# Patient Record
Sex: FEMALE | Race: BLACK OR AFRICAN AMERICAN | Employment: FULL TIME | URBAN - METROPOLITAN AREA
[De-identification: names, ages, dates, MRNs, and addresses within clinical notes are randomized per-mention and may not be internally consistent; named-entity substitution may affect disease eponyms.]

---

## 2020-09-09 ENCOUNTER — VIRTUAL VISIT (OUTPATIENT)
Dept: FAMILY MEDICINE CLINIC | Age: 50
End: 2020-09-09
Payer: COMMERCIAL

## 2020-09-09 DIAGNOSIS — R12 HEARTBURN: ICD-10-CM

## 2020-09-09 DIAGNOSIS — E11.9 TYPE 2 DIABETES MELLITUS WITHOUT COMPLICATION, WITHOUT LONG-TERM CURRENT USE OF INSULIN (HCC): ICD-10-CM

## 2020-09-09 DIAGNOSIS — R35.1 NOCTURIA MORE THAN TWICE PER NIGHT: ICD-10-CM

## 2020-09-09 DIAGNOSIS — R06.09 DOE (DYSPNEA ON EXERTION): ICD-10-CM

## 2020-09-09 DIAGNOSIS — Z83.3 FAMILY HISTORY OF DIABETES MELLITUS (DM): ICD-10-CM

## 2020-09-09 DIAGNOSIS — R53.82 CHRONIC FATIGUE: ICD-10-CM

## 2020-09-09 DIAGNOSIS — Z83.49 FAMILY HISTORY OF OBESITY: ICD-10-CM

## 2020-09-09 DIAGNOSIS — J33.0 NASAL POLYP, BENIGN: ICD-10-CM

## 2020-09-09 DIAGNOSIS — I10 ESSENTIAL HYPERTENSION: ICD-10-CM

## 2020-09-09 DIAGNOSIS — Z82.49 FAMILY HISTORY OF HEART DISEASE: ICD-10-CM

## 2020-09-09 DIAGNOSIS — F43.23 ADJUSTMENT DISORDER WITH MIXED ANXIETY AND DEPRESSED MOOD: ICD-10-CM

## 2020-09-09 DIAGNOSIS — E55.9 VITAMIN D DEFICIENCY: ICD-10-CM

## 2020-09-09 DIAGNOSIS — T78.40XA ALLERGIC STATE, INITIAL ENCOUNTER: ICD-10-CM

## 2020-09-09 DIAGNOSIS — E03.4 HYPOTHYROIDISM DUE TO ACQUIRED ATROPHY OF THYROID: Primary | ICD-10-CM

## 2020-09-09 DIAGNOSIS — R63.5 WEIGHT GAIN: ICD-10-CM

## 2020-09-09 DIAGNOSIS — Z72.821 INADEQUATE SLEEP HYGIENE: ICD-10-CM

## 2020-09-09 DIAGNOSIS — Z82.69 FAMILY HISTORY OF GOUT: ICD-10-CM

## 2020-09-09 DIAGNOSIS — K58.0 IRRITABLE BOWEL SYNDROME WITH DIARRHEA: ICD-10-CM

## 2020-09-09 PROBLEM — E03.9 HYPOTHYROIDISM: Status: ACTIVE | Noted: 2018-01-01

## 2020-09-09 PROCEDURE — 99215 OFFICE O/P EST HI 40 MIN: CPT | Performed by: FAMILY MEDICINE

## 2020-09-09 RX ORDER — NORETHINDRONE ACETATE AND ETHINYL ESTRADIOL, ETHINYL ESTRADIOL AND FERROUS FUMARATE 1MG-10(24)
KIT ORAL
COMMUNITY
Start: 2020-09-03

## 2020-09-09 RX ORDER — METFORMIN HYDROCHLORIDE 1000 MG/1
TABLET ORAL
COMMUNITY
Start: 2020-04-23

## 2020-09-09 RX ORDER — BECLOMETHASONE DIPROPIONATE HFA 80 UG/1
AEROSOL, METERED RESPIRATORY (INHALATION)
COMMUNITY
Start: 2020-08-19

## 2020-09-09 RX ORDER — CHOLECALCIFEROL (VITAMIN D3) 125 MCG
CAPSULE ORAL
COMMUNITY

## 2020-09-09 RX ORDER — MINERAL OIL
180 ENEMA (ML) RECTAL DAILY
COMMUNITY

## 2020-09-09 RX ORDER — MONTELUKAST SODIUM 10 MG/1
10 TABLET ORAL DAILY
COMMUNITY

## 2020-09-09 RX ORDER — LEVOTHYROXINE SODIUM 50 UG/1
TABLET ORAL
COMMUNITY
Start: 2020-06-21

## 2020-09-09 RX ORDER — DEXLANSOPRAZOLE 60 MG/1
60 CAPSULE, DELAYED RELEASE ORAL AS NEEDED
COMMUNITY

## 2020-09-09 RX ORDER — DOXAZOSIN 1 MG/1
TABLET ORAL
COMMUNITY
Start: 2019-07-18

## 2020-09-09 RX ORDER — TELMISARTAN 80 MG/1
TABLET ORAL
COMMUNITY
Start: 2020-07-06

## 2020-09-09 NOTE — PROGRESS NOTES
[facility] Medically Supervised   [facility]   [facility]    VIRTUAL INITIAL PHYSICIAN VISIT      Pursuant to the emergency declaration under the [address] and [place] [address] waiver authority and the [employer] Act, this Virtual Visit was conducted, with patient's consent, to reduce the patient's risk of exposure to COVID-19 and provide continuity of care for an established patient. Services were provided through a video synchronous discussion virtually to substitute for in-person appointment. Consent:  She and/or her healthcare decision maker is aware that this patient-initiated Telehealth encounter is a billable service, with coverage as determined by her insurance carrier. She is aware that she may receive a bill and has provided verbal consent to proceed: Yes    HISTORY OF PRESENT ILLNESS  Agree with nurse and registered dietician notes. Genaro Lujan is a 48 y.o. female with Obesity Class I, There is no height or weight on file to calculate BMI. and associated health concerns presents for evaluation and treatment of weight management. Have you ever participated in any other weight loss programs, self directed or commercial? If so, maximum weight loss? Weight Watchers    Do you have any current major lifestyle changes? She is working remotely due to the Covid-19 pandemic. This has caused her to lose motivation and be more sedentary. 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 Weight History    Current weight 225.8 lbs. Lowest weight 215 lbs. Maximum weight 233 lbs. Goal weight 199 lbs. Have you ever taken weight loss medications or herbal remedies? No.               Have you or anyone in your family ever had weight loss surgery? No.     Eating Habits  How many times a week do you eat fast food or at restaurants?  1  Are you skipping meals and if so, why? No.  Which meals do you eat? Breakfast: oatmeal and a banana, boiled or scrambled egg with turkey barton Lunch: dinner leftover, baked chicken with rice and broccoli; Dinner: Chick-tommy-a nuggets with fries and Coke Zero; Snack: chocolate  Do you have upcoming travel in the next 6 weeks? No.  Do you have a history of binge eating disorder, anorexia, and bulimia? No.              If yes, how long ago and how was it treated? N/A     Drinking Habits  How much caffeine do you drink daily? Do you consume any sugar-sweetened beverages (sodas, teas, juices, etc.)? Coffee with Stevia, tea, [employer] drinks  How much alcohol do you drink daily and weekly? None. How much water do you consume daily? 64-72 oz     Sleep Habits  Do you sleep between 7-9 hours a night? Pt with inadequate sleep hygiene and nocturia 4-5 times nightly gets 4-5 hours of sleep nightly. Have you been diagnosed with Sleep Apnea in the past? Yes. Do you regularly use a CPAP machine? No due to allergic state and L nasal polyp. Exercise  How many days a week do you currently exercise? 1 day a week for 30 minutes  Have you ever been told by a physician not to exercise? No.  Do you know of any reason you shouldn't exercise? No.  Do you own a pedometer or fitness tracker? Yes, a Fitbit. Do you have a gym membership? Yes. What's your favorite physical activity? Walking. She was walking trails daily prior to Covid. Other History  Any history of drug abuse or dependence? No.  Are you pregnant or planning on becoming pregnant within 6 months? No  How many times have you been pregnant? 1 times and 1 children. Any potential unsupportive people in your life? Yes, her sister. Are you ready to lose weight and become healthier? Yes. She is concerned about her health and her increased risk for Covid-19.      Other Medical Care    Pt with hypertension, type 2 DM, hypothyroidism, adjustment disorder with mixed anxiety and depressed mood, IBS, vit d deficiency, heartburn, chronic fatigue, HASSAN, and family hx of gout, obesity, heart disease, and DM presents to the office with a BP of 183/105. She has not taken her medication before checking her BP today. She takes telmisartan 80 mg daily and Cardura 1 mg nightly for BP; tolerating well. Patient denies vision changes, headaches, dizziness, chest pain, SOB, or swelling. She takes metformin 1,000 mg BID for type 2 DM; tolerating well. She takes Synthroid 50 mcg daily for thyroid; tolerating well. She takes Dexilant 60 mg prn for heartburn with relief. Depression Screening    3 most recent PHQ Screens 9/9/2020   Little interest or pleasure in doing things Several days   Feeling down, depressed, irritable, or hopeless Several days   Total Score PHQ 2 2      Pt denies any contraindications to LCD including: history of MI in the last 3 months, Type 1 DM, Type 2 DM, Liver or Kidney disease requiring protein restriction, Recent treatment for Cancer, History of Uric-acid Kidney Stone, Gout, Gall stones, Recent onset of Inflammatory Bowel Disease, or severe Food Allergies. ROS:    Review of Systems negative except as noted above in HPI. ALLERGIES:    Allergies   Allergen Reactions    Sulfa (Sulfonamide Antibiotics) Hives       CURRENT MEDICATIONS:    Outpatient Medications Marked as Taking for the 9/9/20 encounter (Virtual Visit) with Carolyn Cook,    Medication Sig Dispense Refill    metFORMIN (GLUCOPHAGE) 1,000 mg tablet TAKE 1 TABLET BY MOUTH TWICE A DAY      telmisartan (MICARDIS) 80 mg tablet TAKE 1 TABLET BY MOUTH EVERY DAY      levothyroxine (SYNTHROID) 50 mcg tablet TAKE 1 TABLET BY MOUTH EVERY DAY      Qvar RediHaler 80 mcg/actuation HFAb inhaler INHALE 1 2 INHALATIONS TWICE DAILY FOR ASTHMA. USE REGULARLY, AND RINSE MOUTH AFTER USE.  montelukast (Singulair) 10 mg tablet Take 10 mg by mouth daily.  dexlansoprazole (DEXILANT) 60 mg CpDB capsule (delayed release) Take 60 mg by mouth as needed.  doxazosin (CARDURA) 1 mg tablet take 1 tab every evening (1mg)      cholecalciferol, vitamin D3, (Vitamin D3) 50 mcg (2,000 unit) tab Take  by mouth.  ALBUTEROL IN Take  by inhalation.  fexofenadine (ALLEGRA) 180 mg tablet Take 180 mg by mouth daily. Indications: inflammation of the nose due to an allergy         PAST MEDICAL HISTORY:    Past Medical History:   Diagnosis Date    Allergies     year round. Dr. Emelina Hairston.  Asthma     Cyst of right ovary     w varicose veins. Removed.  Diabetes ([address] 2000s    Dr. Maykel Sharp.  Hypertension 2000s    Hypothyroidism 2018    Dr. Maykel Sharp    IBS (irritable bowel syndrome) 2006    Dr. Ellouise Cogan.  w diarrhea predominance    Nasal polyp, benign     left. Dr. Mukesh Davenport Obesity     VELMA (obstructive sleep apnea) 2009    Dr. Joe Calhoun. txd CPAP    Vitamin D deficiency        PAST SURGICAL HISTORY:    Past Surgical History:   Procedure Laterality Date    HX OTHER SURGICAL  1972    OPEN HEART SURGERY due to [address] [place] ? EXTRA BLOOD VESSEL    HX SALPINGO-OOPHORECTOMY Right 2007    due to Ovarian tumor and Varicose Veins.   Dr. Sherryle Kirill:    Family History   Problem Relation Age of Onset    Hypertension Mother     Arthritis-osteo Mother     High Cholesterol Mother     Heart Disease Mother         ANGINA    Dementia Mother     Hypertension Father     Diabetes Father     Gout Father     High Cholesterol Father     Asthma Father     Obesity Father     Diabetes Sister 36        type 2    Childhood heart surgery Sister         OPEN HEART SURGERY    Heart Failure Sister     Hypertension Sister     Obesity Sister     Diabetes Sister     Hypertension Sister     Other Sister         KIDNEY STONE    Obesity Sister     Hypertension Brother     Diabetes Sister     Hypertension Sister     Obesity Sister     No Known Problems Sister        SOCIAL HISTORY:    Social History     Socioeconomic History    Marital status: SINGLE     Spouse name: Not on file    Number of children: Not on file    Years of education: Not on file    Highest education level: Not on file   Tobacco Use    Smoking status: Never Smoker    Smokeless tobacco: Never Used   Substance and Sexual Activity    Alcohol use: Not Currently    Drug use: Never    Sexual activity: Yes     Birth control/protection: Pill   Lifestyle    Physical activity     Days per week: 1 day     Minutes per session: 30 min    Stress: To some extent       IMMUNIZATIONS:    There is no immunization history on file for this patient. PHYSICAL EXAMINATION    Due to this being a TeleHealth encounter ([facility]), evaluation of the following organ systems was limited to:    Vital Signs  There were no vitals taken for this visit. No flowsheet data found. General appearance - Well nourished. Well appearing. Well developed. No acute distress. Obese. Head - Normocephalic. Atraumatic. Eyes - Extraocular eye movements intact. Sclera anicteric. Mildly injected sclera. Ears - Hearing is grossly normal bilaterally. Nose - normal and patent. Chest -  No visualized signs of difficulty breathing or respiratory distress  Heart - normal rate. Regular rhythm. Neurological - awake, alert and oriented to person, place, and time and event. Cranial nerves II through XII intact. Clear speech. Musculoskeletal - Intact x 4 extremities. No pain with movement. Psychological -   normal behavior, dress and thought processes. Good insight. Good eye contact. Normal affect. Appropriate mood. Normal speech. EKG: normal EKG, normal sinus rhythm, unchanged from previous tracings. No prolonged QTc noted. Upper limit is 440 for males & 460 for females. ASSESSMENT and PLAN    ICD-10-CM ICD-9-CM    1. Hypothyroidism due to acquired atrophy of thyroid  E03.4 244.8 TSH 3RD GENERATION     246.8 T4, FREE   2.  Type 2 diabetes mellitus without complication, without long-term current use of insulin (HCC)  E11.9 250.00 LIPID PANEL      METABOLIC PANEL, COMPREHENSIVE      HEMOGLOBIN A1C WITH EAG      INSULIN      MAGNESIUM   3. Irritable bowel syndrome with diarrhea  K58.0 564.1    4. Inadequate sleep hygiene  Z72.821 307.49     due to nocturia vs uncontrolled VELMA vs other   5. Nocturia more than twice per night  R35.1 788.43    6. Vitamin D deficiency  E55.9 268.9 VITAMIN D, 25 HYDROXY   7. Nasal polyp, benign  J33.0 471.0    8. Heartburn  R12 787.1     improves with Dexilant   9. Allergic state, initial encounter  T78.40XA 995.3     worse with walking outdoors   10. Weight gain  R63.5 783.1     since Covid pandemic due to decreased exercise, working remotely   11. Family history of gout  Z82.69 V18.19    12. Family history of diabetes mellitus (DM)  Z83.3 V18.0 REFERRAL TO CARDIOLOGY   15. Family history of heart disease  Z82.49 V17.49 REFERRAL TO CARDIOLOGY   14. Family history of obesity  Z83.49 V18.19    15. Essential hypertension  I10 401.9 REFERRAL TO CARDIOLOGY   16. Chronic fatigue  V19.70 684.22 METABOLIC PANEL, COMPREHENSIVE      TSH 3RD GENERATION      CBC W/O DIFF      URIC ACID      HCG QL SERUM      REFERRAL TO CARDIOLOGY   17. HASSAN (dyspnea on exertion)  R06.00 786.09 CBC W/O DIFF      REFERRAL TO CARDIOLOGY   18. Adjustment disorder with mixed anxiety and depressed mood  F43.23 309.28         Chart reviewed and updated. Based on pt history performed virtually in our office, Ml Haro is a good candidate for the [facility] Medically Supervised Weight Loss Program using the [employer] products for an 800 calorie/day LCD approach, pending cardio EKG and approval.    Recommend pt refer to LCD manual if experiencing any side effects once program is initiated or call our office.      Referred patient to Melford Oppenheim, RD for BS MSWLP to receive [employer] food products and weekly intervention. Discussed the patient's BMI with her. The BMI follow up plan is as follows: I have counseled this patient on diet and exercise regimens. Decrease carbohydrates (white foods, sweet foods, sweet drinks and alcohol), increase green leafy vegetables and protein (lean meats and beans) with each meal.  Avoid fried foods. Do not skip meals. Increase water intake. Avoid sugar-sweetened beverages. Get 7-8 hours uninterrupted sleep nightly. Diet prescription: LCD (very low calorie diet)/800 calories daily using 3-4 meal replacements daily with [address] food/beverage products recommended. Consume a minimum of 2 L (67 oz) of water daily while utilizing LCD. Avoid sugar-sweetened beverages. Exercise prescription: Minimal and as tolerated while using LCD. Sleep prescription: A goal of 7-9 hours of uninterrupted sleep is recommended to turn off the Grehlin hormone to be released from the stomach and triggers appetite while promoting weight gain. Proper rest turns on Leptin hormone to be released from white adipose tissue and promotes weight loss. [facility]ed [facility], Attendance Policy, and [facility] Agreement and Consent previously discussed with Joao Rajan RD and signed by pt. SEE SCANNED DOCUMENTS. Reviewed Coping, Eating, and Exercise Lifestyle Patterns Mini-Quizzes. Discussed appropriate strategies for positive responses. SEE SCANNED DOCUMENTS. Additional relevant handouts given and discussed with patient today. Continue current medication and care. Recommend melatonin 1 mg of camomile tea for sleep. Lab orders mailed due to Covid-19. Get recent office visit notes from PCP. Advised pt to sign release. Referrals given; patient urged to keep appointments with specialists. Cardiology  Counseled patient on health concerns:  VLCD vs LCD, weight loss goals, sleep hygiene, blood pressure, diabetes. Covid - to ER if notice symptoms.   Weight loss goal of 5-10% in 6-12 months has shown significant improvement in obesity and its health consequences. Offered empathy, support, legitimation, prayers, partnership to patient. Praised patient for progress. Follow-up and Dispositions    · Return in about 1 month (around 10/9/2020) for mswl, LCD, results,. Encouraged the pt to sign up for MyChart to be able to view results and send me any questions or concerns prior to the next visit where we will go over results in detail. Patient was offered a choice/choices in the treatment plan today. Patient expresses understanding of the plan and agrees with recommendations. 72 mins spent face to face with patient and more than 50% of this time spent in counseling and coordinating care and/or discussing treatment plans in reference to The primary encounter diagnosis was Hypothyroidism due to acquired atrophy of thyroid. Diagnoses of Type 2 diabetes mellitus without complication, without long-term current use of insulin ([facility] [address] Irritable bowel syndrome with diarrhea, Inadequate sleep hygiene, Nocturia more than twice per night, Vitamin D deficiency, Nasal polyp, benign, Heartburn, Allergic state, initial encounter, Weight gain, Family history of gout, Family history of diabetes mellitus (DM), Family history of heart disease, Family history of obesity, Essential hypertension, Chronic fatigue, HASSAN (dyspnea on exertion), and Adjustment disorder with mixed anxiety and depressed mood were also pertinent to this visit. Jackie Cory has a reminder for a \"due or due soon\" health maintenance. I have asked pt to contact their primary care provider for follow-up on this health maintenance. [address] [facility] [address], MD FOR ALLOWING ME THE PRIVILEGE TO PARTICIPATE IN THE CARE OF OUR MUTUAL PATIENT, Nan Dent WITH RESPECT TO WEIGHT MANAGEMENT. Written by Dr. Peng Galan DO. Documentation True and Accepted by Soham Durham.  Sotero Carmichael

## 2020-09-09 NOTE — PROGRESS NOTES
Steven Camp is a 48 y.o. female     Chief Complaint   Patient presents with    Weight Management     1. Have you been to the ER, urgent care clinic since your last visit? Hospitalized since your last visit? No    2. Have you seen or consulted any other health care providers outside of the [address] [place] [address] since your last visit? Include any pap smears or colon screening.  Yes When: PCP is outside of [address] [place] [address]     Patient did not obtain vital signs at this time    Pain Scale: 0/10  Pain Location:  Patient did not verbalize pain at this time, rated 0/10          Send link to mobile number listed